# Patient Record
Sex: MALE | Race: WHITE | NOT HISPANIC OR LATINO | Employment: OTHER | URBAN - METROPOLITAN AREA
[De-identification: names, ages, dates, MRNs, and addresses within clinical notes are randomized per-mention and may not be internally consistent; named-entity substitution may affect disease eponyms.]

---

## 2024-01-03 PROBLEM — H90.3 SENSORINEURAL HEARING LOSS, BILATERAL: Status: ACTIVE | Noted: 2024-01-03

## 2024-01-03 PROBLEM — J30.9 ALLERGIC RHINITIS: Status: ACTIVE | Noted: 2024-01-03

## 2024-01-03 PROBLEM — H93.19 TINNITUS: Status: ACTIVE | Noted: 2024-01-03

## 2024-01-03 PROBLEM — H61.23 BILATERAL IMPACTED CERUMEN: Status: ACTIVE | Noted: 2024-01-03

## 2024-01-03 PROBLEM — K21.9 GASTROESOPHAGEAL REFLUX: Status: ACTIVE | Noted: 2024-01-03

## 2024-01-03 PROBLEM — R51.9 HEADACHE: Status: ACTIVE | Noted: 2024-01-03

## 2024-01-03 RX ORDER — SIMVASTATIN 40 MG/1
40 TABLET, FILM COATED ORAL NIGHTLY
COMMUNITY
Start: 2015-08-03

## 2024-01-03 RX ORDER — GABAPENTIN 300 MG/1
300 CAPSULE ORAL NIGHTLY
COMMUNITY
Start: 2016-03-15

## 2024-01-03 RX ORDER — ACETAMINOPHEN 500 MG
2000 TABLET ORAL DAILY
COMMUNITY
Start: 2017-02-08

## 2024-01-03 RX ORDER — VALSARTAN 160 MG/1
160 TABLET ORAL DAILY
COMMUNITY
Start: 2016-04-04

## 2024-01-09 ENCOUNTER — APPOINTMENT (OUTPATIENT)
Dept: OTOLARYNGOLOGY | Facility: CLINIC | Age: 79
End: 2024-01-09
Payer: MEDICARE

## 2024-04-02 ENCOUNTER — OFFICE VISIT (OUTPATIENT)
Dept: OTOLARYNGOLOGY | Facility: CLINIC | Age: 79
End: 2024-04-02
Payer: MEDICARE

## 2024-04-02 VITALS — WEIGHT: 244 LBS | BODY MASS INDEX: 36.14 KG/M2 | HEIGHT: 69 IN

## 2024-04-02 DIAGNOSIS — J34.89 NASAL DRAINAGE: ICD-10-CM

## 2024-04-02 DIAGNOSIS — J32.9 CHRONIC RHINOSINUSITIS: Primary | ICD-10-CM

## 2024-04-02 DIAGNOSIS — J31.0 CHRONIC RHINITIS: ICD-10-CM

## 2024-04-02 DIAGNOSIS — R44.8 FACIAL PRESSURE: ICD-10-CM

## 2024-04-02 DIAGNOSIS — J34.89 NASAL OBSTRUCTION: ICD-10-CM

## 2024-04-02 DIAGNOSIS — J34.3 HYPERTROPHY OF BOTH INFERIOR NASAL TURBINATES: ICD-10-CM

## 2024-04-02 DIAGNOSIS — H61.22 IMPACTED CERUMEN OF LEFT EAR: ICD-10-CM

## 2024-04-02 DIAGNOSIS — R09.81 NASAL CONGESTION: ICD-10-CM

## 2024-04-02 PROBLEM — Q79.8: Status: ACTIVE | Noted: 2023-10-06

## 2024-04-02 PROBLEM — K56.41 FECAL IMPACTION (MULTI): Status: ACTIVE | Noted: 2024-04-02

## 2024-04-02 PROBLEM — N32.81 OVERACTIVE BLADDER: Status: ACTIVE | Noted: 2017-02-08

## 2024-04-02 PROBLEM — I25.10 CORONARY ARTERY DISEASE INVOLVING NATIVE CORONARY ARTERY OF NATIVE HEART WITHOUT ANGINA PECTORIS: Status: ACTIVE | Noted: 2017-05-18

## 2024-04-02 PROBLEM — N50.9 DISORDER OF MALE GENITAL ORGANS: Status: ACTIVE | Noted: 2017-10-14

## 2024-04-02 PROBLEM — N50.82 SCROTAL PAIN: Status: ACTIVE | Noted: 2024-04-02

## 2024-04-02 PROBLEM — N45.1 EPIDIDYMITIS: Status: ACTIVE | Noted: 2020-03-04

## 2024-04-02 PROBLEM — R97.20 ELEVATED PROSTATE SPECIFIC ANTIGEN (PSA): Status: ACTIVE | Noted: 2017-02-08

## 2024-04-02 PROBLEM — N40.0 BPH WITHOUT OBSTRUCTION/LOWER URINARY TRACT SYMPTOMS: Status: ACTIVE | Noted: 2017-02-08

## 2024-04-02 PROBLEM — H10.9 UNSPECIFIED CONJUNCTIVITIS: Status: ACTIVE | Noted: 2024-04-02

## 2024-04-02 PROBLEM — N52.9 ERECTILE DYSFUNCTION: Status: ACTIVE | Noted: 2019-08-30

## 2024-04-02 PROBLEM — R35.1 NOCTURIA: Status: ACTIVE | Noted: 2017-02-08

## 2024-04-02 PROBLEM — S29.011A STRAIN OF MUSCLE AND TENDON OF FRONT WALL OF THORAX, INITIAL ENCOUNTER: Status: ACTIVE | Noted: 2024-04-02

## 2024-04-02 PROBLEM — R10.33 PERIUMBILICAL PAIN: Status: ACTIVE | Noted: 2024-04-02

## 2024-04-02 PROBLEM — N50.811 TESTICULAR PAIN, RIGHT: Status: ACTIVE | Noted: 2020-03-04

## 2024-04-02 PROBLEM — E29.1 TESTICULAR HYPOFUNCTION: Status: ACTIVE | Noted: 2017-02-08

## 2024-04-02 PROBLEM — K59.00 CONSTIPATION, UNSPECIFIED: Status: ACTIVE | Noted: 2024-04-02

## 2024-04-02 PROBLEM — M79.602 MUSCULOSKELETAL PAIN OF LEFT UPPER EXTREMITY: Status: ACTIVE | Noted: 2023-10-06

## 2024-04-02 PROBLEM — R31.9 HEMATURIA: Status: ACTIVE | Noted: 2017-10-14

## 2024-04-02 PROCEDURE — 1036F TOBACCO NON-USER: CPT | Performed by: STUDENT IN AN ORGANIZED HEALTH CARE EDUCATION/TRAINING PROGRAM

## 2024-04-02 PROCEDURE — 69210 REMOVE IMPACTED EAR WAX UNI: CPT | Performed by: STUDENT IN AN ORGANIZED HEALTH CARE EDUCATION/TRAINING PROGRAM

## 2024-04-02 PROCEDURE — 31231 NASAL ENDOSCOPY DX: CPT | Performed by: STUDENT IN AN ORGANIZED HEALTH CARE EDUCATION/TRAINING PROGRAM

## 2024-04-02 PROCEDURE — 99204 OFFICE O/P NEW MOD 45 MIN: CPT | Performed by: STUDENT IN AN ORGANIZED HEALTH CARE EDUCATION/TRAINING PROGRAM

## 2024-04-02 PROCEDURE — 1159F MED LIST DOCD IN RCRD: CPT | Performed by: STUDENT IN AN ORGANIZED HEALTH CARE EDUCATION/TRAINING PROGRAM

## 2024-04-02 RX ORDER — TRAZODONE HYDROCHLORIDE 50 MG/1
50 TABLET ORAL NIGHTLY
COMMUNITY
Start: 2024-02-13

## 2024-04-02 RX ORDER — CALCIPOTRIENE 50 UG/G
OINTMENT TOPICAL 2 TIMES DAILY
COMMUNITY
Start: 2023-10-29

## 2024-04-02 RX ORDER — MOMETASONE FUROATE 50 UG/1
2 SPRAY, METERED NASAL DAILY
Qty: 48 G | Refills: 3 | Status: SHIPPED | OUTPATIENT
Start: 2024-04-02 | End: 2024-04-02 | Stop reason: SDUPTHER

## 2024-04-02 RX ORDER — TOBRAMYCIN AND DEXAMETHASONE 3; 1 MG/ML; MG/ML
1 SUSPENSION/ DROPS OPHTHALMIC
COMMUNITY
Start: 2024-03-29

## 2024-04-02 RX ORDER — IPRATROPIUM BROMIDE 21 UG/1
2 SPRAY, METERED NASAL 3 TIMES DAILY PRN
Qty: 30 ML | Refills: 3 | Status: SHIPPED | OUTPATIENT
Start: 2024-04-02 | End: 2024-04-02 | Stop reason: SDUPTHER

## 2024-04-02 RX ORDER — IBUPROFEN 800 MG/1
800 TABLET ORAL 3 TIMES DAILY
COMMUNITY
Start: 2023-12-15

## 2024-04-02 RX ORDER — CYCLOBENZAPRINE HCL 5 MG
5 TABLET ORAL NIGHTLY
COMMUNITY

## 2024-04-02 RX ORDER — METOPROLOL TARTRATE 50 MG/1
50 TABLET ORAL
COMMUNITY
Start: 2016-01-31

## 2024-04-02 RX ORDER — SOD CHLOR,BICARB/SQUEEZ BOTTLE
PACKET, WITH RINSE DEVICE NASAL
Qty: 1 EACH | Refills: 3 | Status: SHIPPED | OUTPATIENT
Start: 2024-04-02 | End: 2024-04-02 | Stop reason: SDUPTHER

## 2024-04-02 RX ORDER — SOD CHLOR,BICARB/SQUEEZ BOTTLE
PACKET, WITH RINSE DEVICE NASAL
Qty: 1 EACH | Refills: 3 | Status: SHIPPED | OUTPATIENT
Start: 2024-04-02

## 2024-04-02 RX ORDER — CITALOPRAM 20 MG/1
20 TABLET, FILM COATED ORAL DAILY
COMMUNITY
Start: 2024-01-17 | End: 2024-04-16

## 2024-04-02 RX ORDER — IPRATROPIUM BROMIDE 21 UG/1
2 SPRAY, METERED NASAL 3 TIMES DAILY PRN
Qty: 30 ML | Refills: 3 | Status: SHIPPED | OUTPATIENT
Start: 2024-04-02 | End: 2024-04-26

## 2024-04-02 RX ORDER — MOMETASONE FUROATE 50 UG/1
2 SPRAY, METERED NASAL DAILY
Qty: 48 G | Refills: 3 | Status: SHIPPED | OUTPATIENT
Start: 2024-04-02 | End: 2025-04-02

## 2024-04-02 RX ORDER — LINACLOTIDE 145 UG/1
145 CAPSULE, GELATIN COATED ORAL
COMMUNITY
Start: 2024-02-08

## 2024-04-02 RX ORDER — DOCUSATE SODIUM 50 MG/5ML
50 LIQUID ORAL DAILY
COMMUNITY

## 2024-04-02 RX ORDER — ACETAMINOPHEN 500 MG
1000 TABLET ORAL EVERY 6 HOURS PRN
COMMUNITY

## 2024-04-02 RX ORDER — FLUTICASONE PROPIONATE 50 MCG
1 SPRAY, SUSPENSION (ML) NASAL DAILY
COMMUNITY
Start: 2023-12-02

## 2024-04-02 RX ORDER — TADALAFIL 20 MG/1
20 TABLET ORAL DAILY PRN
COMMUNITY
Start: 2023-04-14

## 2024-04-02 NOTE — PROGRESS NOTES
HPI  78-year-old male presenting for initial evaluation of multiple sinonasal complaints.  Main Symptoms: Daily sinonasal symptoms include bilateral nasal congestion, anterior posterior nasal drainage, occasional fluctuant facial pressure along the maxillary and frontal regions bilaterally  Duration: > 6 months   Laterality: Bilateral  Developed multiple URIs/episodes of sinusitis throughout winter.  Nasal drainage is occasionally worsened by temperature changes  Was previously prescribed Flonase, reports allergies to one of its components  Endorses occasional headaches  Patient denies facial pain, facial numbness, ansomia, dental pain, watery/ itchy eyes, immunotherapy.    No odynophagia/dysphagia/SOB/dyspnea/hoarseness/fevers/chills/weight loss/night sweats.    Current treatment:  Nasal Steroid: No  Sinus Rinse: No  Antihistamine: No  Prednisone: No  Other: None    Recent CT: None    Previous nasal/sinus surgery: Reports history of right nasal lesion (benign) removed in the 80s.     The patient also endorses bilateral progressive hearing loss for years, developed left cerumen buildup which is affecting his hearing, no other otologic complaints.      History reviewed. No pertinent past medical history.     Past Surgical History:   Procedure Laterality Date    HERNIA REPAIR  02/08/2017    Inguinal Hernia Repair    TONSILLECTOMY          Current Outpatient Medications   Medication Instructions    acetaminophen (TYLENOL) 1,000 mg, oral, Every 6 hours PRN    calcipotriene (Dovonex) 0.005 % ointment Topical, 2 times daily    cholecalciferol (VITAMIN D-3) 2,000 Units, oral, Daily    citalopram (CELEXA) 20 mg, oral, Daily    cyclobenzaprine (FLEXERIL) 5 mg, oral, Nightly    docusate sodium (COLACE) 50 mg, oral, Daily    fluticasone (Flonase) 50 mcg/actuation nasal spray 1 spray, Each Nostril, Daily    gabapentin (NEURONTIN) 300 mg, oral, Nightly    ibuprofen 800 mg, oral, 3 times daily    Linzess 145 mcg, oral, Daily  before breakfast    raNITIdine (Zantac) 150 mg capsule 1 capsule, oral, Nightly    simvastatin (ZOCOR) 40 mg, oral, Nightly    tadalafil (CIALIS) 20 mg, oral, Daily PRN    tobramycin-dexamethasone (Tobradex) ophthalmic suspension 1 drop, Both Eyes, Every 4 hours while awake    traZODone (DESYREL) 50 mg, oral, Nightly    valsartan (DIOVAN) 160 mg, oral, Daily        Allergies   Allergen Reactions    Labetalol Anxiety    Amlodipine Other     UPSET STOMACH AND PAIN GERD    Chlorthalidone Other     GERD UPSET STOMACH AND PAIN    Clonazepam Other    Finasteride Other    Omeprazole Unknown    Sulfamethoxazole-Trimethoprim Unknown    Perflutren Lipid Microspheres Rash        Review of Systems  A detailed 12 point ROS was performed and is negative except as noted in the intake form, HPI and/or Past Medical History    Physical Exam   CONSTITUTIONAL: Well developed, well nourished.  VOICE: Normal voice quality  RESPIRATION: Breathing comfortably, no stridor.  CV: No clubbing/cyanosis/edema in hands.  EYES: EOM Intact, sclera normal.  NEURO: Alert and oriented times 3, Cranial nerves V,VII intact and symmetric bilaterally.  HEAD AND FACE: Symmetric facial features, no masses or lesions, sinuses nontender to palpation.  SALIVARY GLANDS: Parotid and submandibular glands normal bilaterally.  + EARS: Normal external ears  Right EAC patent, tympanic membrane intact  Left EAC with cerumen obstruction (removed)  Left EAC patent, tympanic membrane intact  + NOSE: External nose midline, anterior rhinoscopy is normal with limited visualization to the anterior aspect of the interior turbinates. No lesions noted.  Bilateral inferior turbinate hypertrophy  Septum mostly ML, slight right deviation  ORAL CAVITY/OROPHARYNX/LIPS: Normal mucous membranes, normal floor of mouth/tongue/OP, no masses or lesions are noted.  PHARYNGEAL WALLS AND NASOPHARYNX: No masses noted. Mucosa appears clean and moist  NECK/LYMPH: No LAD, no thyroid masses.  Trachea palpably midline  SKIN: Neck skin is without injury  PSYCH: Alert and oriented with appropriate mood and affect     Nasal endoscopy:  PROCEDURE NOTE    For better visualization because of septal deviation, turbinate hypertrophy nasal endoscopy was performed after verbal consent was obtained by the patient and/or guardian. Both nostrils were sprayed with a mixture of lidocaine 4% and Afrin. After a sufficient amount of time elapsed for mucosal anesthesia to take place, the nasal endoscope was advanced into the nostril.    The following areas were visualized:  Nasal passage, nasal septum, turbinates, middle meatus, nasopharynx, sinus ostia    The patient tolerated the procedure well and these structures were found to be normal except as follows:  Bilateral inferior turbinate hypertrophy  Septum mostly ML, slight right deviation  Mild bilateral generalized mucosal edema  No mucopurulence, polyps, nor masses seen in inferior meati, middle meati, nor sphenoethmoidal recesses bilaterally.     Procedure: Removal of impacted cerumen, left   Indication: Cerumen impaction.   The procedure was reviewed and explained.  Verbal consent was obtained.  With the use of the otoscope the left ear was examined, cerumen was cleaned with the use of curettes and suction. The patient tolerated the procedure well.  Hearing returned to baseline following cerumen removal.         Assessment  Chronic rhinosinusitis   Facial pressure  Nasal airway obstruction  Nasal drainage  Bilateral inferior turbinate hypertrophy  Hearing loss, cerumen impaction      Plan  -CRS  The patient and I discussed their current nasal / sinus symptoms as well as several therapeutic options. I would like to begin a nasal hygiene/ medical regimen consisting of Nasonex (reports allergy to components within Flonase), saline irrigations.  In addition the patient endorses possible vasomotor drivers, ipratropium nasal spray added to his regimen.    The patient was  instructed on the correct technique to administer the topical nasal spray (s) aiming at the lateral nasal wall for maximal efficacy and to avoid irritation to the septum which could lead to epistaxis. I stressed consistency of usage for maximal benefit. We discussed the rationale for the timing of this therapy and the benefits and potential adverse effects.      - HL, cerumen impaction  Left cerumen impaction removed, preventive measure discussed.  We will proceed with audiogram at follow-up    RTC 2m

## 2024-04-02 NOTE — PATIENT INSTRUCTIONS
Start Nasonex nasal spray as prescribed  Use ipratropium nasal spray as needed for nasal drainage  Start daily saline irrigations (irrigate your nose with saline solution prior to using medicated nasal sprays)    Saline irrigations:   Pre-Made Saline Products    You can purchase a ready-made saline irrigation product from most pharmacies, grocery stores, or natural food stores. NeilMed and [employer]yr are two common brands. Although we do not recommend one particular brand over another, we do recommend the squirt bottle style instead of the pot. You should use one pre-made powder packet to one full bottle of solution. One full bottle will be used with each rinse session. You should only use distilled or bottled water, not tap or well water, and you should not keep unused solution.      To Use Your Rinse:    Fill irrigation device of your choice carefully and allow the solution to warm up to room temperature. You should not heat the solution in the microwave as this can cause burns    Stand over a sink or in shower. Breathe slowly in through your mouth and squirt the mixture into one nostril until the solution comes out of the other nostril or your mouth.    Repeat this process until you have used about half of the bottle in one nostril; then change and use the remaining solution on the opposite side. You should use the WHOLE bottle in one rinse session.    After use, all rinse devices should be thoroughly cleaned. Please refer to the ´s guidelines with pre-made systems. Warm soapy water may also be used.

## 2024-04-25 DIAGNOSIS — J31.0 CHRONIC RHINITIS: ICD-10-CM

## 2024-04-26 RX ORDER — IPRATROPIUM BROMIDE 21 UG/1
2 SPRAY, METERED NASAL 3 TIMES DAILY PRN
Qty: 90 ML | Refills: 2 | Status: SHIPPED | OUTPATIENT
Start: 2024-04-26 | End: 2025-04-26

## 2024-06-04 ENCOUNTER — APPOINTMENT (OUTPATIENT)
Dept: AUDIOLOGY | Facility: CLINIC | Age: 79
End: 2024-06-04
Payer: MEDICARE

## 2024-06-04 ENCOUNTER — APPOINTMENT (OUTPATIENT)
Dept: OTOLARYNGOLOGY | Facility: CLINIC | Age: 79
End: 2024-06-04
Payer: MEDICARE

## 2024-06-26 ENCOUNTER — APPOINTMENT (OUTPATIENT)
Dept: OTOLARYNGOLOGY | Facility: CLINIC | Age: 79
End: 2024-06-26
Payer: MEDICARE

## 2024-06-26 ENCOUNTER — APPOINTMENT (OUTPATIENT)
Dept: AUDIOLOGY | Facility: CLINIC | Age: 79
End: 2024-06-26
Payer: MEDICARE

## 2024-06-28 ENCOUNTER — APPOINTMENT (OUTPATIENT)
Dept: AUDIOLOGY | Facility: CLINIC | Age: 79
End: 2024-06-28
Payer: MEDICARE

## 2024-06-28 ENCOUNTER — APPOINTMENT (OUTPATIENT)
Dept: OTOLARYNGOLOGY | Facility: CLINIC | Age: 79
End: 2024-06-28
Payer: MEDICARE

## 2024-08-27 NOTE — PROGRESS NOTES
Subjective   Patient ID: Anibal Gutierrez is a 79 y.o. male who presents for No chief complaint on file..  HPI  This 79-year-old male last seen in this office in April by Dr. Jaramillo For evaluation of sinus complaints is being seen today for hearing loss.  According to his previous history he was having daily sinus congestion and postnasal discharge and states that he had multiple URIs in the previous winter.  He did have some ceruminous buildup in the left ear when last seen which was removed.   There was no recorded history of tinnitus or vertigo.  He has had no passed hearing test.  Review of Systems  A 12 point ROS has been reviewed and are negative for complaint except what is stated in the history of present illness and/or past medical history as noted in the EMR    Past Medical History:   Diagnosis Date    Chest pain     Coronary heart disease     Depression     Hiatal hernia     High cholesterol     History of bronchitis     Hypertension     Prostate disorder     Shortness of breath     Sleep apnea           Current Outpatient Medications:     acetaminophen (Tylenol) 500 mg tablet, Take 2 tablets (1,000 mg) by mouth every 6 hours if needed for moderate pain (4 - 6) or mild pain (1 - 3)., Disp: , Rfl:     calcipotriene (Dovonex) 0.005 % ointment, Apply topically 2 times a day., Disp: , Rfl:     cholecalciferol (Vitamin D-3) 50 mcg (2,000 unit) capsule, Take 1 capsule (50 mcg) by mouth early in the morning.., Disp: , Rfl:     citalopram (CeleXA) 20 mg tablet, Take 1 tablet (20 mg) by mouth once daily., Disp: , Rfl:     cyclobenzaprine (Flexeril) 5 mg tablet, Take 1 tablet (5 mg) by mouth once daily at bedtime., Disp: , Rfl:     docusate sodium (Colace) 50 mg/5 mL oral liquid, Take 5 mL (50 mg) by mouth once daily., Disp: , Rfl:     fluticasone (Flonase) 50 mcg/actuation nasal spray, Administer 1 spray into each nostril once daily., Disp: , Rfl:     gabapentin (Neurontin) 300 mg capsule, Take 1 capsule (300 mg) by  mouth once daily at bedtime., Disp: , Rfl:     ibuprofen 800 mg tablet, Take 1 tablet (800 mg) by mouth 3 times a day., Disp: , Rfl:     ipratropium (Atrovent) 21 mcg (0.03 %) nasal spray, ADMINISTER 2 SPRAYS INTO EACH NOSTRIL 3 TIMES A DAY AS NEEDED FOR RHINITIS (FOR NASAL DRAINAGE)., Disp: 90 mL, Rfl: 2    Linzess 145 mcg capsule, Take 1 capsule (145 mcg) by mouth once daily in the morning. Take before meals., Disp: , Rfl:     metoprolol tartrate (Lopressor) 50 mg tablet, Take 1 tablet by mouth once daily., Disp: , Rfl:     mometasone (Nasonex) 50 mcg/actuation nasal spray, Administer 2 sprays into each nostril once daily., Disp: 48 g, Rfl: 3    raNITIdine (Zantac) 150 mg capsule, Take 1 capsule (150 mg) by mouth once daily at bedtime., Disp: , Rfl:     simvastatin (Zocor) 40 mg tablet, Take 1 tablet (40 mg) by mouth once daily at bedtime., Disp: , Rfl:     sod chloride-sodium bicarb (Neilmed Sinus Rinse Complete) nasal rinse, Irrigate your nose per instructions twice daily, Disp: 1 each, Rfl: 3    tadalafil 20 mg tablet, Take 1 tablet (20 mg) by mouth once daily as needed for erectile dysfunction., Disp: , Rfl:     tobramycin-dexamethasone (Tobradex) ophthalmic suspension, Administer 1 drop into both eyes every 4 hours while awake., Disp: , Rfl:     traZODone (Desyrel) 50 mg tablet, Take 1 tablet (50 mg) by mouth once daily at bedtime., Disp: , Rfl:     valsartan (Diovan) 160 mg tablet, Take 1 tablet (160 mg) by mouth once daily., Disp: , Rfl:      Social History     Tobacco Use    Smoking status: Former     Current packs/day: 0.00     Average packs/day: 1 pack/day for 10.0 years (10.0 ttl pk-yrs)     Types: Cigarettes     Start date:      Quit date:      Years since quittin.6     Passive exposure: Past    Smokeless tobacco: Never   Substance Use Topics    Alcohol use: Not Currently        Allergies   Allergen Reactions    Labetalol Anxiety    Amlodipine Other     UPSET STOMACH AND PAIN GERD     Chlorthalidone Other     GERD UPSET STOMACH AND PAIN    Clonazepam Other    Finasteride Other    Omeprazole Unknown    Sulfamethoxazole-Trimethoprim Unknown    Perflutren Lipid Microspheres Rash       There were no vitals taken for this visit.    Objective   Physical Exam  EXAMINATION:    GENERAL CLINT.EARANCE: Alert, in no acute distress, normal pitch and clarity of voice, well-developed and nourished, cooperative.    HEAD/FACE: Normocephalic, atraumatic, normal facial movements and strength, no no tenderness to palpation, no lesions noted.    SKIN: Normal turgor, no raised or ulcerative lesions, warm and dry to palpation.    EYES: Extraocular motions intact, no nystagmus noted, pupils equal and reactive to light and accommodation, no conjunctivitis.    EARS: Both ears--external ear anatomy is normal without lesions, auditory canals are patent and without skin abrasions or lesions, nonobstructive cerumen was noted in both ears and was removed hearing is intact to voice, tympanic membranes are intact with no acute inflammation, light reflexes present, no effusions are noted and no mastoid tenderness found to palpation.    NOSE: No external skin lesions are noted, nares are patent, septum is intact, sinuses are nontender to palpation bilaterally, no intranasal lesions or inflammation is noted, nasal valve is normal.    OROPHARYNX/ORAL CAVITY: Oropharynx is not inflamed and is without lesions, mucosa of the oral cavity is intact and without lesions, tongue is midline and mobile, no acute dental disease is noted, TMJs are mobile    LUNG-- NO wheezing or rhonchi normal respiratory effort    HEART-- No venous congestion,  rate and rhythm regular,    NECK: No lymphadenopathy is palpated, carotid pulses are intact, neck is supple with full range of motion, no thyroid abnormalities are noted, trachea is midline, no neck masses are palpated.    LYMPHATICS: No cervical adenopathy or supraclavicular adenopathy is  palpated.    NEUROLOGIC/PSYCH; alert and oriented, cranial nerves are grossly intact, gait is without falling, no motor deficits are noted.    Patient ID: Anibal Gutierrez is a 79 y.o. male.    Ear cerumen removal    Date/Time: 8/29/2024 10:02 AM    Performed by: Armand Holbrook DMD, MD  Authorized by: Armand Holbrook DMD, MD    Consent:     Consent obtained:  Verbal    Consent given by:  Patient    Risks discussed:  Pain and incomplete removal    Alternatives discussed:  No treatment and alternative treatment  Universal protocol:     Procedure explained and questions answered to patient or proxy's satisfaction: yes      Imaging studies available: no      Required blood products, implants, devices, and special equipment available: no      Patient identity confirmed:  Verbally with patient  Procedure details:     Location:  L ear and R ear    Procedure type: curette      Procedure type comment:  Or suction    Procedure outcomes: cerumen removed    Post-procedure details:     Inspection:  No bleeding and ear canal clear    Hearing quality:  Improved    Procedure completion:  Tolerated well, no immediate complications    His audiogram today revealed still normal hearing up to 500 Hz then a mild to moderately severe sloping sensory hearing loss was noted slight asymmetry was noted in the mid frequencies right lower than left.  Discrimination scores are 90% bilaterally at 70 dB.  Tympanograms were normal  Assessment/Plan   Problem List Items Addressed This Visit             ICD-10-CM    Bilateral impacted cerumen H61.23    Sensorineural hearing loss, bilateral - Primary H90.3    Tinnitus H93.19     Other Visit Diagnoses         Codes    Chronic rhinitis     J31.0    Hypertrophy of both inferior nasal turbinates     J34.3          I discussed the clinical finds with the patient.  From a nasal standpoint I spoke to him at length about nasal saline rinsing using the NeilMed system using purified water to mix the saline.   He might also benefit from the saline packets with xylitol since he has had recurrent upper respiratory tract infections.  He can use Nasonex or Flonase as a topical steroidal spray which might be of benefit as well.  I discussed the audiogram findings with the patient. Sensorineural hearing loss is noted and it is recommended to avoid loud noise without ear protection, avoid excessive dietary salt, and caffeine especially if tinnitus is noted. A yearly follow-up is advised but they should contact the office if sudden hearing loss develops, tinnitus increases, or if acute vertigo develops.    After discussing the hearing test results, this patient is a candidate for amplification of hearing. The limiting factor in success is the ability to discriminate words and is discussed. All questions were answered fully and arrangements can be made with the audiology staff for this process if motivated to pursue it.  I did advise he have a yearly audiogram done as well.    This patient is advised to follow up with their PCP for all other health care issues and treatment. Dictation was done with dragon transcription and errors in spelling  and diction are possible.       Armand Holbrook DMD, MD 08/27/24 1:05 PM

## 2024-08-29 ENCOUNTER — APPOINTMENT (OUTPATIENT)
Dept: AUDIOLOGY | Facility: CLINIC | Age: 79
End: 2024-08-29
Payer: MEDICARE

## 2024-08-29 ENCOUNTER — APPOINTMENT (OUTPATIENT)
Dept: OTOLARYNGOLOGY | Facility: CLINIC | Age: 79
End: 2024-08-29
Payer: MEDICARE

## 2024-08-29 VITALS — HEIGHT: 69 IN | BODY MASS INDEX: 36.73 KG/M2 | WEIGHT: 248 LBS

## 2024-08-29 DIAGNOSIS — J34.3 HYPERTROPHY OF BOTH INFERIOR NASAL TURBINATES: ICD-10-CM

## 2024-08-29 DIAGNOSIS — H90.3 SENSORINEURAL HEARING LOSS, BILATERAL: Primary | ICD-10-CM

## 2024-08-29 DIAGNOSIS — H61.23 BILATERAL IMPACTED CERUMEN: ICD-10-CM

## 2024-08-29 DIAGNOSIS — H93.13 TINNITUS OF BOTH EARS: ICD-10-CM

## 2024-08-29 DIAGNOSIS — J31.0 CHRONIC RHINITIS: ICD-10-CM

## 2024-08-29 PROBLEM — M47.812 SPONDYLOSIS WITHOUT MYELOPATHY OR RADICULOPATHY, CERVICAL REGION: Status: ACTIVE | Noted: 2024-08-29

## 2024-08-29 PROCEDURE — 1160F RVW MEDS BY RX/DR IN RCRD: CPT | Performed by: OTOLARYNGOLOGY

## 2024-08-29 PROCEDURE — 1159F MED LIST DOCD IN RCRD: CPT | Performed by: OTOLARYNGOLOGY

## 2024-08-29 PROCEDURE — 92567 TYMPANOMETRY: CPT | Performed by: AUDIOLOGIST

## 2024-08-29 PROCEDURE — 99214 OFFICE O/P EST MOD 30 MIN: CPT | Performed by: OTOLARYNGOLOGY

## 2024-08-29 PROCEDURE — 92557 COMPREHENSIVE HEARING TEST: CPT | Performed by: AUDIOLOGIST

## 2024-08-29 PROCEDURE — 69210 REMOVE IMPACTED EAR WAX UNI: CPT | Performed by: OTOLARYNGOLOGY

## 2024-08-29 PROCEDURE — 1036F TOBACCO NON-USER: CPT | Performed by: OTOLARYNGOLOGY

## 2024-08-29 RX ORDER — METHYLCELLULOSE
POWDER (GRAM) ORAL
COMMUNITY

## 2024-08-29 RX ORDER — DICYCLOMINE HYDROCHLORIDE 10 MG/1
10 CAPSULE ORAL 3 TIMES DAILY
COMMUNITY
Start: 2024-08-28

## 2024-08-29 RX ORDER — MELOXICAM 15 MG/1
15 TABLET ORAL DAILY
COMMUNITY
Start: 2024-08-24

## 2024-08-29 NOTE — PROGRESS NOTES
COMPREHENSIVE AUDIOMETRIC EVALUATION      Name:  Anibal Gutierrez  :  1945  Age:  79 y.o.  Date of Evaluation:  24   Referring Provider:  Armand Holbrook DMD, MD     History:  Mr. Gutierrez was seen today for an evaluation of hearing.  Patient reported concerns for decreased hearing sensitivity.  When asked, patient denied previous hearing aid use, otalgia, aural fullness, and tinnitus    See audiometric evaluation at end of this report or scanned under media tab    OTOSCOPY:       Right Ear: Minimal non-occluding cerumen       Left Ear: Significant though non-occluding cerumen    226 Hz TYMPANOMETRY:       Right Ear: Type A: normal peak pressure, compliance, and ear canal volume, consistent with middle ear function within normal limits       Left Ear: Type Ad: hypercompliant with normal peak pressure and ear canal volume    AUDIOMETRIC EVALUATION (Phones):       Right Ear: Normal sloping to Severe Sensorineural hearing loss                 Left Ear: Normal sloping to Moderately severe Sensorineural hearing loss           NOTE: Hearing sensitivity decreased bilaterally as compared to most recent audiometric evaluation 2017    Test technique:  Standard Audiometry  Reliability:   good    SPEECH RECOGNITION THRESHOLD:       Right Ear:  20 dBHL in good agreement with Calhoun's average PTA       Left Ear:  15 dBHL in good agreement with Calhoun's average PTA    WORD RECOGNITION:       Right Ear:  excellent (90%) at elevated presentation level       Left Ear:  excellent (90%) at elevated presentation level    DISCUSSION:   Discussed results and recommendations with patient.  Questions were addressed and the patient was encouraged to contact our department should concerns arise.    RECOMMENDATIONS:  -Recommend patient return should concerns for changes in hearing sensitivity arise or as medically indicated.   -Recommend patient return for hearing aid evaluation and move forward with hearing aids,  binaurally.    Jani Martin, CCC-A     Appt: 9:00 - 9:30 AM

## 2025-06-15 NOTE — PROGRESS NOTES
PATIENT ID  Anibal Gutierrez IS A 80 y.o. male WHO PRESENTS FOR      HPI   This 80-year-old male seen last in the office in August of last year is being seen in follow-up.  He had previously seen  for upper respiratory tract symptoms and was to follow-up with him.  He feels as if his cough has been increasing influenced by air quality.  In review of his past provider notes there is been no recent evaluation done for coughing by his PCP.  His nasal medications when last seen included Atrovent nasal spray and Nasonex spray.  He is also been on Flonase in the past.  No recent chest x-rays have been done however 1 from January 2024 was negative for acute problems.  He has multiple drug allergies including sulfa as an antibiotic and omeprazole which he was on for reflux.  When last seen I had recommended that he use NeilMed sinus rinse kit with salt and xylitol packets.  ROS    A 12 point ROS  has been reviewed and are negative for complaint except for what is stated in the history of present illness and /or for past medical history as noted in the EMR.     Medical History[1]    Current Medications[2]    Social History[3]    RX Allergies[4]     vitals were not taken for this visit.     PHYSICAL EXAM  EXAMINATION:    GENERAL CLINT.EARANCE: Alert, in no acute distress, normal pitch and clarity of voice, well-developed and nourished, cooperative.    HEAD/FACE: Normocephalic, atraumatic, normal facial movements and strength, no no tenderness to palpation, no lesions noted.    SKIN: Normal turgor, no raised or ulcerative lesions, warm and dry to palpation.    EYES: Extraocular motions intact, no nystagmus noted, pupils equal and reactive to light and accommodation, no conjunctivitis.    EARS: Both ears--external ear anatomy is normal without lesions, auditory canals are patent and without skin abrasions or lesions, hearing is intact to voice, tympanic membranes are intact with no acute inflammation, light reflexes  present, no effusions are noted and no mastoid tenderness found to palpation.    NOSE: No external skin lesions are noted, nares are patent, septum is intact, sinuses are nontender to palpation bilaterally, no intranasal lesions or inflammation is noted, nasal valve is normal.    OROPHARYNX/ORAL CAVITY: Oropharynx is not inflamed and is without lesions, mucosa of the oral cavity is intact and without lesions, tongue is midline and mobile, no acute dental disease is noted, TMJs are mobile    LUNG-- NO wheezing or rhonchi normal respiratory effort    HEART-- No venous congestion,  rate and rhythm regular,    NECK: No lymphadenopathy is palpated, carotid pulses are intact, neck is supple with full range of motion, no thyroid abnormalities are noted, trachea is midline, no neck masses are palpated.    LYMPHATICS: No cervical adenopathy or supraclavicular adenopathy is palpated.    NEUROLOGIC/PSYCH; alert and oriented, cranial nerves are grossly intact, gait is without falling, no motor deficits are noted.    ASSESSMENT/PLAN  {Assess/Plan SmartLinks (Optional) - DX aware test:04161}       [1]   Past Medical History:  Diagnosis Date    Chest pain     Coronary heart disease     Depression     Hiatal hernia     High cholesterol     History of bronchitis     Hypertension     Prostate disorder     Shortness of breath     Sleep apnea    [2]   Current Outpatient Medications:     acetaminophen (Tylenol) 500 mg tablet, Take 2 tablets (1,000 mg) by mouth every 6 hours if needed for moderate pain (4 - 6) or mild pain (1 - 3)., Disp: , Rfl:     calcipotriene (Dovonex) 0.005 % ointment, Apply topically 2 times a day., Disp: , Rfl:     cholecalciferol (Vitamin D-3) 50 mcg (2,000 unit) capsule, Take 1 capsule (50 mcg) by mouth early in the morning.., Disp: , Rfl:     citalopram (CeleXA) 20 mg tablet, Take 1 tablet (20 mg) by mouth once daily., Disp: , Rfl:     cyclobenzaprine (Flexeril) 5 mg tablet, Take 1 tablet (5 mg) by mouth once  daily at bedtime., Disp: , Rfl:     dicyclomine (Bentyl) 10 mg capsule, Take 1 capsule (10 mg) by mouth 3 times a day., Disp: , Rfl:     docusate sodium (Colace) 50 mg/5 mL oral liquid, Take 5 mL (50 mg) by mouth once daily., Disp: , Rfl:     fluticasone (Flonase) 50 mcg/actuation nasal spray, Administer 1 spray into each nostril once daily., Disp: , Rfl:     gabapentin (Neurontin) 300 mg capsule, Take 1 capsule (300 mg) by mouth once daily at bedtime., Disp: , Rfl:     ibuprofen 800 mg tablet, Take 1 tablet (800 mg) by mouth 3 times a day., Disp: , Rfl:     ipratropium (Atrovent) 21 mcg (0.03 %) nasal spray, ADMINISTER 2 SPRAYS INTO EACH NOSTRIL 3 TIMES A DAY AS NEEDED FOR RHINITIS (FOR NASAL DRAINAGE)., Disp: 90 mL, Rfl: 2    Linzess 145 mcg capsule, Take 1 capsule (145 mcg) by mouth once daily in the morning. Take before meals., Disp: , Rfl:     meloxicam (Mobic) 15 mg tablet, Take 1 tablet (15 mg) by mouth once daily., Disp: , Rfl:     methylcellulose, laxative, (CitruceL Sugar Free) powder, Take by mouth., Disp: , Rfl:     metoprolol tartrate (Lopressor) 50 mg tablet, Take 1 tablet by mouth once daily., Disp: , Rfl:     mometasone (Nasonex) 50 mcg/actuation nasal spray, Administer 2 sprays into each nostril once daily., Disp: 48 g, Rfl: 3    raNITIdine (Zantac) 150 mg capsule, Take 1 capsule (150 mg) by mouth once daily at bedtime., Disp: , Rfl:     simvastatin (Zocor) 40 mg tablet, Take 1 tablet (40 mg) by mouth once daily at bedtime., Disp: , Rfl:     sod chloride-sodium bicarb (Neilmed Sinus Rinse Complete) nasal rinse, Irrigate your nose per instructions twice daily, Disp: 1 each, Rfl: 3    tadalafil 20 mg tablet, Take 1 tablet (20 mg) by mouth once daily as needed for erectile dysfunction., Disp: , Rfl:     tobramycin-dexamethasone (Tobradex) ophthalmic suspension, Administer 1 drop into both eyes every 4 hours while awake., Disp: , Rfl:     traZODone (Desyrel) 50 mg tablet, Take 1 tablet (50 mg) by mouth  once daily at bedtime., Disp: , Rfl:     valsartan (Diovan) 160 mg tablet, Take 1 tablet (160 mg) by mouth once daily., Disp: , Rfl:   [3]   Social History  Tobacco Use    Smoking status: Former     Current packs/day: 0.00     Average packs/day: 1 pack/day for 10.0 years (10.0 ttl pk-yrs)     Types: Cigarettes     Start date:      Quit date:      Years since quittin.4     Passive exposure: Past    Smokeless tobacco: Never   Substance Use Topics    Alcohol use: Not Currently    Drug use: Never   [4]   Allergies  Allergen Reactions    Labetalol Anxiety    Amlodipine Other     UPSET STOMACH AND PAIN GERD    Chlorthalidone Other     GERD UPSET STOMACH AND PAIN    Clonazepam Other    Finasteride Other    Omeprazole Unknown    Sulfamethoxazole-Trimethoprim Unknown    Perflutren Lipid Microspheres Rash      Rfl:     methylcellulose, laxative, (CitruceL Sugar Free) powder, Take by mouth., Disp: , Rfl:     metoprolol tartrate (Lopressor) 50 mg tablet, Take 1 tablet by mouth once daily., Disp: , Rfl:     mometasone (Nasonex) 50 mcg/actuation nasal spray, Administer 2 sprays into each nostril once daily., Disp: 48 g, Rfl: 3    raNITIdine (Zantac) 150 mg capsule, Take 1 capsule (150 mg) by mouth once daily at bedtime., Disp: , Rfl:     simvastatin (Zocor) 40 mg tablet, Take 1 tablet (40 mg) by mouth once daily at bedtime., Disp: , Rfl:     sod chloride-sodium bicarb (Neilmed Sinus Rinse Complete) nasal rinse, Irrigate your nose per instructions twice daily, Disp: 1 each, Rfl: 3    tadalafil 20 mg tablet, Take 1 tablet (20 mg) by mouth once daily as needed for erectile dysfunction., Disp: , Rfl:     tobramycin-dexamethasone (Tobradex) ophthalmic suspension, Administer 1 drop into both eyes every 4 hours while awake., Disp: , Rfl:     traZODone (Desyrel) 50 mg tablet, Take 1 tablet (50 mg) by mouth once daily at bedtime., Disp: , Rfl:     valsartan (Diovan) 160 mg tablet, Take 1 tablet (160 mg) by mouth once daily., Disp: , Rfl:   [3]   Social History  Tobacco Use    Smoking status: Former     Current packs/day: 0.00     Average packs/day: 1 pack/day for 10.0 years (10.0 ttl pk-yrs)     Types: Cigarettes     Start date:      Quit date:      Years since quittin.4     Passive exposure: Past    Smokeless tobacco: Never   Substance Use Topics    Alcohol use: Not Currently    Drug use: Never   [4]   Allergies  Allergen Reactions    Labetalol Anxiety    Amlodipine Other     UPSET STOMACH AND PAIN GERD    Chlorthalidone Other     GERD UPSET STOMACH AND PAIN    Clonazepam Other    Finasteride Other    Omeprazole Unknown    Sulfamethoxazole-Trimethoprim Unknown    Perflutren Lipid Microspheres Rash

## 2025-06-17 ENCOUNTER — APPOINTMENT (OUTPATIENT)
Dept: OTOLARYNGOLOGY | Facility: CLINIC | Age: 80
End: 2025-06-17
Payer: MEDICARE

## 2025-06-17 DIAGNOSIS — J34.89 NASAL OBSTRUCTION: ICD-10-CM

## 2025-06-17 DIAGNOSIS — R05.9 COUGH, UNSPECIFIED TYPE: ICD-10-CM

## 2025-06-17 DIAGNOSIS — J34.2 NASAL SEPTAL DEVIATION: ICD-10-CM

## 2025-06-17 DIAGNOSIS — K21.00 GASTROESOPHAGEAL REFLUX DISEASE WITH ESOPHAGITIS WITHOUT HEMORRHAGE: ICD-10-CM

## 2025-06-17 DIAGNOSIS — H90.3 SENSORINEURAL HEARING LOSS, BILATERAL: Primary | ICD-10-CM

## 2025-06-17 DIAGNOSIS — J34.3 HYPERTROPHY OF BOTH INFERIOR NASAL TURBINATES: ICD-10-CM

## 2025-06-17 DIAGNOSIS — J31.0 CHRONIC RHINITIS: ICD-10-CM

## 2025-06-17 PROCEDURE — 1036F TOBACCO NON-USER: CPT | Performed by: OTOLARYNGOLOGY

## 2025-06-17 PROCEDURE — 99214 OFFICE O/P EST MOD 30 MIN: CPT | Performed by: OTOLARYNGOLOGY

## 2025-06-17 PROCEDURE — 1160F RVW MEDS BY RX/DR IN RCRD: CPT | Performed by: OTOLARYNGOLOGY

## 2025-06-17 PROCEDURE — 31575 DIAGNOSTIC LARYNGOSCOPY: CPT | Performed by: OTOLARYNGOLOGY

## 2025-06-17 PROCEDURE — 1159F MED LIST DOCD IN RCRD: CPT | Performed by: OTOLARYNGOLOGY

## 2025-09-08 ENCOUNTER — APPOINTMENT (OUTPATIENT)
Dept: OTOLARYNGOLOGY | Facility: CLINIC | Age: 80
End: 2025-09-08
Payer: MEDICARE

## 2025-09-08 ENCOUNTER — APPOINTMENT (OUTPATIENT)
Dept: AUDIOLOGY | Facility: CLINIC | Age: 80
End: 2025-09-08
Payer: MEDICARE